# Patient Record
Sex: MALE | Race: WHITE | NOT HISPANIC OR LATINO | Employment: FULL TIME | URBAN - METROPOLITAN AREA
[De-identification: names, ages, dates, MRNs, and addresses within clinical notes are randomized per-mention and may not be internally consistent; named-entity substitution may affect disease eponyms.]

---

## 2020-09-15 ENCOUNTER — HOSPITAL ENCOUNTER (INPATIENT)
Facility: HOSPITAL | Age: 60
LOS: 1 days | Discharge: HOME | DRG: 177 | End: 2020-09-16
Attending: EMERGENCY MEDICINE | Admitting: INTERNAL MEDICINE
Payer: OTHER GOVERNMENT

## 2020-09-15 DIAGNOSIS — J18.9 PNEUMONIA OF BOTH LOWER LOBES DUE TO INFECTIOUS ORGANISM: ICD-10-CM

## 2020-09-15 DIAGNOSIS — U07.1 COVID-19 VIRUS INFECTION: Primary | ICD-10-CM

## 2020-09-15 DIAGNOSIS — Z20.822 SUSPECTED COVID-19 VIRUS INFECTION: ICD-10-CM

## 2020-09-15 DIAGNOSIS — R09.02 HYPOXEMIA: ICD-10-CM

## 2020-09-15 LAB
ALBUMIN SERPL BCP-MCNC: 3.6 G/DL (ref 3.5–5.2)
ALLENS TEST: ABNORMAL
ALP SERPL-CCNC: 66 U/L (ref 55–135)
ALT SERPL W/O P-5'-P-CCNC: 104 U/L (ref 10–44)
ANION GAP SERPL CALC-SCNC: 18 MMOL/L (ref 8–16)
AST SERPL-CCNC: 93 U/L (ref 10–40)
BASOPHILS # BLD AUTO: 0.02 K/UL (ref 0–0.2)
BASOPHILS NFR BLD: 0.5 % (ref 0–1.9)
BILIRUB SERPL-MCNC: 0.6 MG/DL (ref 0.1–1)
BUN SERPL-MCNC: 11 MG/DL (ref 6–20)
CALCIUM SERPL-MCNC: 9.3 MG/DL (ref 8.7–10.5)
CHLORIDE SERPL-SCNC: 92 MMOL/L (ref 95–110)
CK SERPL-CCNC: 343 U/L (ref 20–200)
CO2 SERPL-SCNC: 24 MMOL/L (ref 23–29)
CREAT SERPL-MCNC: 1.1 MG/DL (ref 0.5–1.4)
CRP SERPL-MCNC: 36.8 MG/L (ref 0–8.2)
DELSYS: ABNORMAL
DIFFERENTIAL METHOD: ABNORMAL
EOSINOPHIL # BLD AUTO: 0 K/UL (ref 0–0.5)
EOSINOPHIL NFR BLD: 0 % (ref 0–8)
ERYTHROCYTE [DISTWIDTH] IN BLOOD BY AUTOMATED COUNT: 12.4 % (ref 11.5–14.5)
EST. GFR  (AFRICAN AMERICAN): >60 ML/MIN/1.73 M^2
EST. GFR  (NON AFRICAN AMERICAN): >60 ML/MIN/1.73 M^2
FIO2: 28
FLOW: 2
GLUCOSE SERPL-MCNC: 333 MG/DL (ref 70–110)
HCO3 UR-SCNC: 26.4 MMOL/L (ref 24–28)
HCT VFR BLD AUTO: 52.2 % (ref 40–54)
HCV AB SERPL QL IA: NEGATIVE
HGB BLD-MCNC: 17.2 G/DL (ref 14–18)
HIV 1+2 AB+HIV1 P24 AG SERPL QL IA: NEGATIVE
IMM GRANULOCYTES # BLD AUTO: 0.03 K/UL (ref 0–0.04)
IMM GRANULOCYTES NFR BLD AUTO: 0.7 % (ref 0–0.5)
LACTATE SERPL-SCNC: 1.7 MMOL/L (ref 0.5–2.2)
LDH SERPL L TO P-CCNC: 411 U/L (ref 110–260)
LYMPHOCYTES # BLD AUTO: 0.9 K/UL (ref 1–4.8)
LYMPHOCYTES NFR BLD: 20.4 % (ref 18–48)
MCH RBC QN AUTO: 29.4 PG (ref 27–31)
MCHC RBC AUTO-ENTMCNC: 33 G/DL (ref 32–36)
MCV RBC AUTO: 89 FL (ref 82–98)
MODE: ABNORMAL
MONOCYTES # BLD AUTO: 0.6 K/UL (ref 0.3–1)
MONOCYTES NFR BLD: 13.5 % (ref 4–15)
NEUTROPHILS # BLD AUTO: 2.7 K/UL (ref 1.8–7.7)
NEUTROPHILS NFR BLD: 64.9 % (ref 38–73)
NRBC BLD-RTO: 0 /100 WBC
PCO2 BLDA: 36.1 MMHG (ref 35–45)
PH SMN: 7.47 [PH] (ref 7.35–7.45)
PLATELET # BLD AUTO: 187 K/UL (ref 150–350)
PMV BLD AUTO: 10.8 FL (ref 9.2–12.9)
PO2 BLDA: 51 MMHG (ref 80–100)
POC BE: 3 MMOL/L
POC SATURATED O2: 88 % (ref 95–100)
POTASSIUM SERPL-SCNC: 4.2 MMOL/L (ref 3.5–5.1)
PROT SERPL-MCNC: 7.9 G/DL (ref 6–8.4)
RBC # BLD AUTO: 5.86 M/UL (ref 4.6–6.2)
SAMPLE: ABNORMAL
SARS-COV-2 RDRP RESP QL NAA+PROBE: POSITIVE
SITE: ABNORMAL
SODIUM SERPL-SCNC: 134 MMOL/L (ref 136–145)
TROPONIN I SERPL DL<=0.01 NG/ML-MCNC: 0.02 NG/ML (ref 0–0.03)
WBC # BLD AUTO: 4.22 K/UL (ref 3.9–12.7)

## 2020-09-15 PROCEDURE — 25000003 PHARM REV CODE 250: Performed by: EMERGENCY MEDICINE

## 2020-09-15 PROCEDURE — 83605 ASSAY OF LACTIC ACID: CPT

## 2020-09-15 PROCEDURE — 83615 LACTATE (LD) (LDH) ENZYME: CPT

## 2020-09-15 PROCEDURE — 99291 CRITICAL CARE FIRST HOUR: CPT | Mod: 25

## 2020-09-15 PROCEDURE — 93010 ELECTROCARDIOGRAM REPORT: CPT | Mod: ,,, | Performed by: INTERNAL MEDICINE

## 2020-09-15 PROCEDURE — 84484 ASSAY OF TROPONIN QUANT: CPT

## 2020-09-15 PROCEDURE — 86803 HEPATITIS C AB TEST: CPT

## 2020-09-15 PROCEDURE — 99900035 HC TECH TIME PER 15 MIN (STAT)

## 2020-09-15 PROCEDURE — 82728 ASSAY OF FERRITIN: CPT

## 2020-09-15 PROCEDURE — 82550 ASSAY OF CK (CPK): CPT

## 2020-09-15 PROCEDURE — 80053 COMPREHEN METABOLIC PANEL: CPT

## 2020-09-15 PROCEDURE — 96372 THER/PROPH/DIAG INJ SC/IM: CPT

## 2020-09-15 PROCEDURE — U0002 COVID-19 LAB TEST NON-CDC: HCPCS

## 2020-09-15 PROCEDURE — 99285 EMERGENCY DEPT VISIT HI MDM: CPT | Mod: 25

## 2020-09-15 PROCEDURE — 87040 BLOOD CULTURE FOR BACTERIA: CPT | Mod: 59

## 2020-09-15 PROCEDURE — 93005 ELECTROCARDIOGRAM TRACING: CPT

## 2020-09-15 PROCEDURE — 63600175 PHARM REV CODE 636 W HCPCS: Performed by: EMERGENCY MEDICINE

## 2020-09-15 PROCEDURE — 94640 AIRWAY INHALATION TREATMENT: CPT

## 2020-09-15 PROCEDURE — 93010 EKG 12-LEAD: ICD-10-PCS | Mod: ,,, | Performed by: INTERNAL MEDICINE

## 2020-09-15 PROCEDURE — 86140 C-REACTIVE PROTEIN: CPT

## 2020-09-15 PROCEDURE — 36600 WITHDRAWAL OF ARTERIAL BLOOD: CPT

## 2020-09-15 PROCEDURE — 25000242 PHARM REV CODE 250 ALT 637 W/ HCPCS: Performed by: EMERGENCY MEDICINE

## 2020-09-15 PROCEDURE — 86703 HIV-1/HIV-2 1 RESULT ANTBDY: CPT

## 2020-09-15 PROCEDURE — 85025 COMPLETE CBC W/AUTO DIFF WBC: CPT

## 2020-09-15 PROCEDURE — 82803 BLOOD GASES ANY COMBINATION: CPT

## 2020-09-15 RX ORDER — DEXAMETHASONE SODIUM PHOSPHATE 4 MG/ML
6 INJECTION, SOLUTION INTRA-ARTICULAR; INTRALESIONAL; INTRAMUSCULAR; INTRAVENOUS; SOFT TISSUE
Status: COMPLETED | OUTPATIENT
Start: 2020-09-15 | End: 2020-09-15

## 2020-09-15 RX ORDER — IPRATROPIUM BROMIDE AND ALBUTEROL SULFATE 2.5; .5 MG/3ML; MG/3ML
3 SOLUTION RESPIRATORY (INHALATION)
Status: COMPLETED | OUTPATIENT
Start: 2020-09-15 | End: 2020-09-15

## 2020-09-15 RX ADMIN — IPRATROPIUM BROMIDE AND ALBUTEROL SULFATE 3 ML: .5; 3 SOLUTION RESPIRATORY (INHALATION) at 09:09

## 2020-09-15 RX ADMIN — SODIUM CHLORIDE 500 ML: 0.9 INJECTION, SOLUTION INTRAVENOUS at 10:09

## 2020-09-15 RX ADMIN — DEXAMETHASONE SODIUM PHOSPHATE 6 MG: 4 INJECTION, SOLUTION INTRA-ARTICULAR; INTRALESIONAL; INTRAMUSCULAR; INTRAVENOUS; SOFT TISSUE at 09:09

## 2020-09-16 VITALS
HEIGHT: 69 IN | TEMPERATURE: 97 F | RESPIRATION RATE: 18 BRPM | HEART RATE: 79 BPM | DIASTOLIC BLOOD PRESSURE: 79 MMHG | OXYGEN SATURATION: 97 % | SYSTOLIC BLOOD PRESSURE: 147 MMHG | BODY MASS INDEX: 44.73 KG/M2 | WEIGHT: 302 LBS

## 2020-09-16 PROBLEM — U07.1 COVID-19 VIRUS INFECTION: Status: ACTIVE | Noted: 2020-09-16

## 2020-09-16 PROBLEM — R73.9 HYPERGLYCEMIA: Status: ACTIVE | Noted: 2020-09-16

## 2020-09-16 PROBLEM — R74.8 ABNORMAL LIVER ENZYMES: Status: ACTIVE | Noted: 2020-09-16

## 2020-09-16 PROBLEM — R09.02 HYPOXIA: Status: ACTIVE | Noted: 2020-09-16

## 2020-09-16 PROBLEM — J18.9 PNEUMONIA OF BOTH LOWER LOBES DUE TO INFECTIOUS ORGANISM: Status: ACTIVE | Noted: 2020-09-16

## 2020-09-16 PROBLEM — E66.01 CLASS 3 SEVERE OBESITY WITHOUT SERIOUS COMORBIDITY WITH BODY MASS INDEX (BMI) OF 40.0 TO 44.9 IN ADULT: Status: ACTIVE | Noted: 2020-09-16

## 2020-09-16 LAB
25(OH)D3+25(OH)D2 SERPL-MCNC: 21 NG/ML (ref 30–96)
ALBUMIN SERPL BCP-MCNC: 3.4 G/DL (ref 3.5–5.2)
ALP SERPL-CCNC: 66 U/L (ref 55–135)
ALT SERPL W/O P-5'-P-CCNC: 112 U/L (ref 10–44)
ANION GAP SERPL CALC-SCNC: 16 MMOL/L (ref 8–16)
AST SERPL-CCNC: 83 U/L (ref 10–40)
BACTERIA #/AREA URNS HPF: ABNORMAL /HPF
BASOPHILS # BLD AUTO: 0 K/UL (ref 0–0.2)
BASOPHILS NFR BLD: 0 % (ref 0–1.9)
BILIRUB SERPL-MCNC: 0.4 MG/DL (ref 0.1–1)
BILIRUB UR QL STRIP: NEGATIVE
BNP SERPL-MCNC: 17 PG/ML (ref 0–99)
BUN SERPL-MCNC: 11 MG/DL (ref 6–20)
CALCIUM SERPL-MCNC: 8.7 MG/DL (ref 8.7–10.5)
CHLORIDE SERPL-SCNC: 98 MMOL/L (ref 95–110)
CHOLEST SERPL-MCNC: 163 MG/DL (ref 120–199)
CHOLEST/HDLC SERPL: 5.3 {RATIO} (ref 2–5)
CLARITY UR: CLEAR
CO2 SERPL-SCNC: 21 MMOL/L (ref 23–29)
COLOR UR: YELLOW
CREAT SERPL-MCNC: 1.1 MG/DL (ref 0.5–1.4)
D DIMER PPP IA.FEU-MCNC: 0.53 MG/L FEU
DIFFERENTIAL METHOD: ABNORMAL
EOSINOPHIL # BLD AUTO: 0 K/UL (ref 0–0.5)
EOSINOPHIL NFR BLD: 0 % (ref 0–8)
ERYTHROCYTE [DISTWIDTH] IN BLOOD BY AUTOMATED COUNT: 12.3 % (ref 11.5–14.5)
ERYTHROCYTE [SEDIMENTATION RATE] IN BLOOD BY WESTERGREN METHOD: 17 MM/HR (ref 0–10)
EST. GFR  (AFRICAN AMERICAN): >60 ML/MIN/1.73 M^2
EST. GFR  (NON AFRICAN AMERICAN): >60 ML/MIN/1.73 M^2
ESTIMATED AVG GLUCOSE: 286 MG/DL (ref 68–131)
FERRITIN SERPL-MCNC: 1271 NG/ML (ref 20–300)
GLUCOSE SERPL-MCNC: 353 MG/DL (ref 70–110)
GLUCOSE UR QL STRIP: ABNORMAL
HBA1C MFR BLD HPLC: 11.6 % (ref 4–5.6)
HCT VFR BLD AUTO: 50.6 % (ref 40–54)
HDLC SERPL-MCNC: 31 MG/DL (ref 40–75)
HDLC SERPL: 19 % (ref 20–50)
HGB BLD-MCNC: 16.6 G/DL (ref 14–18)
HGB UR QL STRIP: ABNORMAL
IMM GRANULOCYTES # BLD AUTO: 0.03 K/UL (ref 0–0.04)
IMM GRANULOCYTES NFR BLD AUTO: 1 % (ref 0–0.5)
KETONES UR QL STRIP: ABNORMAL
LDLC SERPL CALC-MCNC: 105 MG/DL (ref 63–159)
LEUKOCYTE ESTERASE UR QL STRIP: NEGATIVE
LYMPHOCYTES # BLD AUTO: 0.6 K/UL (ref 1–4.8)
LYMPHOCYTES NFR BLD: 19.6 % (ref 18–48)
MAGNESIUM SERPL-MCNC: 1.9 MG/DL (ref 1.6–2.6)
MCH RBC QN AUTO: 29.6 PG (ref 27–31)
MCHC RBC AUTO-ENTMCNC: 32.8 G/DL (ref 32–36)
MCV RBC AUTO: 90 FL (ref 82–98)
MICROSCOPIC COMMENT: ABNORMAL
MONOCYTES # BLD AUTO: 0.3 K/UL (ref 0.3–1)
MONOCYTES NFR BLD: 9.6 % (ref 4–15)
NEUTROPHILS # BLD AUTO: 2.1 K/UL (ref 1.8–7.7)
NEUTROPHILS NFR BLD: 69.8 % (ref 38–73)
NITRITE UR QL STRIP: NEGATIVE
NONHDLC SERPL-MCNC: 132 MG/DL
NRBC BLD-RTO: 0 /100 WBC
PH UR STRIP: 6 [PH] (ref 5–8)
PHOSPHATE SERPL-MCNC: 4.2 MG/DL (ref 2.7–4.5)
PLATELET # BLD AUTO: 203 K/UL (ref 150–350)
PMV BLD AUTO: 10.2 FL (ref 9.2–12.9)
POCT GLUCOSE: 305 MG/DL (ref 70–110)
POCT GLUCOSE: 326 MG/DL (ref 70–110)
POTASSIUM SERPL-SCNC: 4.3 MMOL/L (ref 3.5–5.1)
PROT SERPL-MCNC: 7.2 G/DL (ref 6–8.4)
PROT UR QL STRIP: ABNORMAL
RBC # BLD AUTO: 5.61 M/UL (ref 4.6–6.2)
RBC #/AREA URNS HPF: 5 /HPF (ref 0–4)
SODIUM SERPL-SCNC: 135 MMOL/L (ref 136–145)
SP GR UR STRIP: 1.02 (ref 1–1.03)
TRIGL SERPL-MCNC: 135 MG/DL (ref 30–150)
TSH SERPL DL<=0.005 MIU/L-ACNC: 0.45 UIU/ML (ref 0.4–4)
URN SPEC COLLECT METH UR: ABNORMAL
UROBILINOGEN UR STRIP-ACNC: NEGATIVE EU/DL
WBC # BLD AUTO: 3.01 K/UL (ref 3.9–12.7)
YEAST URNS QL MICRO: ABNORMAL

## 2020-09-16 PROCEDURE — 85651 RBC SED RATE NONAUTOMATED: CPT

## 2020-09-16 PROCEDURE — 27000221 HC OXYGEN, UP TO 24 HOURS

## 2020-09-16 PROCEDURE — 82306 VITAMIN D 25 HYDROXY: CPT

## 2020-09-16 PROCEDURE — 21400001 HC TELEMETRY ROOM

## 2020-09-16 PROCEDURE — 83036 HEMOGLOBIN GLYCOSYLATED A1C: CPT

## 2020-09-16 PROCEDURE — 25000003 PHARM REV CODE 250: Performed by: EMERGENCY MEDICINE

## 2020-09-16 PROCEDURE — 85379 FIBRIN DEGRADATION QUANT: CPT

## 2020-09-16 PROCEDURE — C9399 UNCLASSIFIED DRUGS OR BIOLOG: HCPCS | Performed by: EMERGENCY MEDICINE

## 2020-09-16 PROCEDURE — 83880 ASSAY OF NATRIURETIC PEPTIDE: CPT

## 2020-09-16 PROCEDURE — 80061 LIPID PANEL: CPT

## 2020-09-16 PROCEDURE — 94761 N-INVAS EAR/PLS OXIMETRY MLT: CPT

## 2020-09-16 PROCEDURE — 84443 ASSAY THYROID STIM HORMONE: CPT

## 2020-09-16 PROCEDURE — 25000003 PHARM REV CODE 250: Performed by: INTERNAL MEDICINE

## 2020-09-16 PROCEDURE — 81000 URINALYSIS NONAUTO W/SCOPE: CPT

## 2020-09-16 PROCEDURE — 99900035 HC TECH TIME PER 15 MIN (STAT)

## 2020-09-16 PROCEDURE — 63600175 PHARM REV CODE 636 W HCPCS: Performed by: INTERNAL MEDICINE

## 2020-09-16 PROCEDURE — 80053 COMPREHEN METABOLIC PANEL: CPT

## 2020-09-16 PROCEDURE — 83735 ASSAY OF MAGNESIUM: CPT

## 2020-09-16 PROCEDURE — 84100 ASSAY OF PHOSPHORUS: CPT

## 2020-09-16 PROCEDURE — 80074 ACUTE HEPATITIS PANEL: CPT

## 2020-09-16 PROCEDURE — 85025 COMPLETE CBC W/AUTO DIFF WBC: CPT

## 2020-09-16 PROCEDURE — 36415 COLL VENOUS BLD VENIPUNCTURE: CPT

## 2020-09-16 RX ORDER — INSULIN ASPART 100 [IU]/ML
1-10 INJECTION, SOLUTION INTRAVENOUS; SUBCUTANEOUS
Status: DISCONTINUED | OUTPATIENT
Start: 2020-09-16 | End: 2020-09-16 | Stop reason: HOSPADM

## 2020-09-16 RX ORDER — IBUPROFEN 200 MG
24 TABLET ORAL
Status: DISCONTINUED | OUTPATIENT
Start: 2020-09-16 | End: 2020-09-16 | Stop reason: HOSPADM

## 2020-09-16 RX ORDER — ASPIRIN 325 MG
325 TABLET ORAL DAILY
Qty: 14 TABLET | Refills: 0
Start: 2020-09-16 | End: 2020-09-30

## 2020-09-16 RX ORDER — ENOXAPARIN SODIUM 100 MG/ML
40 INJECTION SUBCUTANEOUS EVERY 24 HOURS
Status: DISCONTINUED | OUTPATIENT
Start: 2020-09-16 | End: 2020-09-16 | Stop reason: HOSPADM

## 2020-09-16 RX ORDER — ACETAMINOPHEN 325 MG/1
650 TABLET ORAL EVERY 8 HOURS PRN
Refills: 0
Start: 2020-09-16 | End: 2020-09-30

## 2020-09-16 RX ORDER — ASPIRIN 325 MG
325 TABLET ORAL DAILY
Status: DISCONTINUED | OUTPATIENT
Start: 2020-09-16 | End: 2020-09-16 | Stop reason: HOSPADM

## 2020-09-16 RX ORDER — GUAIFENESIN/DEXTROMETHORPHAN 100-10MG/5
10 SYRUP ORAL EVERY 4 HOURS PRN
Status: DISCONTINUED | OUTPATIENT
Start: 2020-09-16 | End: 2020-09-16 | Stop reason: HOSPADM

## 2020-09-16 RX ORDER — ONDANSETRON 2 MG/ML
4 INJECTION INTRAMUSCULAR; INTRAVENOUS EVERY 8 HOURS PRN
Status: DISCONTINUED | OUTPATIENT
Start: 2020-09-16 | End: 2020-09-16 | Stop reason: HOSPADM

## 2020-09-16 RX ORDER — IBUPROFEN 200 MG
16 TABLET ORAL
Status: DISCONTINUED | OUTPATIENT
Start: 2020-09-16 | End: 2020-09-16 | Stop reason: HOSPADM

## 2020-09-16 RX ORDER — DEXAMETHASONE 6 MG/1
6 TABLET ORAL DAILY
Qty: 10 TABLET | Refills: 0 | Status: SHIPPED | OUTPATIENT
Start: 2020-09-17 | End: 2020-09-27

## 2020-09-16 RX ORDER — CHOLECALCIFEROL (VITAMIN D3) 25 MCG
1000 TABLET ORAL DAILY
Status: DISCONTINUED | OUTPATIENT
Start: 2020-09-16 | End: 2020-09-16 | Stop reason: HOSPADM

## 2020-09-16 RX ORDER — SODIUM CHLORIDE 0.9 % (FLUSH) 0.9 %
10 SYRINGE (ML) INJECTION
Status: DISCONTINUED | OUTPATIENT
Start: 2020-09-16 | End: 2020-09-16 | Stop reason: HOSPADM

## 2020-09-16 RX ORDER — ASCORBIC ACID 500 MG
500 TABLET ORAL 2 TIMES DAILY
COMMUNITY
Start: 2020-09-16 | End: 2020-09-30

## 2020-09-16 RX ORDER — SODIUM CHLORIDE 9 MG/ML
INJECTION, SOLUTION INTRAVENOUS CONTINUOUS
Status: ACTIVE | OUTPATIENT
Start: 2020-09-16 | End: 2020-09-16

## 2020-09-16 RX ORDER — ACETAMINOPHEN 325 MG/1
650 TABLET ORAL EVERY 8 HOURS PRN
Status: DISCONTINUED | OUTPATIENT
Start: 2020-09-16 | End: 2020-09-16 | Stop reason: HOSPADM

## 2020-09-16 RX ORDER — GLUCAGON 1 MG
1 KIT INJECTION
Status: DISCONTINUED | OUTPATIENT
Start: 2020-09-16 | End: 2020-09-16 | Stop reason: HOSPADM

## 2020-09-16 RX ORDER — ASCORBIC ACID 500 MG
500 TABLET ORAL 2 TIMES DAILY
Status: DISCONTINUED | OUTPATIENT
Start: 2020-09-16 | End: 2020-09-16 | Stop reason: HOSPADM

## 2020-09-16 RX ORDER — CHOLECALCIFEROL (VITAMIN D3) 25 MCG
1000 TABLET ORAL DAILY
Qty: 30 TABLET | Refills: 0
Start: 2020-09-17 | End: 2020-10-17

## 2020-09-16 RX ORDER — GUAIFENESIN/DEXTROMETHORPHAN 100-10MG/5
10 SYRUP ORAL EVERY 4 HOURS PRN
Refills: 0 | COMMUNITY
Start: 2020-09-16 | End: 2020-09-26

## 2020-09-16 RX ADMIN — INSULIN ASPART 8 UNITS: 100 INJECTION, SOLUTION INTRAVENOUS; SUBCUTANEOUS at 11:09

## 2020-09-16 RX ADMIN — THERA TABS 1 TABLET: TAB at 09:09

## 2020-09-16 RX ADMIN — INSULIN DETEMIR 15 UNITS: 100 INJECTION, SOLUTION SUBCUTANEOUS at 08:09

## 2020-09-16 RX ADMIN — Medication 1000 UNITS: at 09:09

## 2020-09-16 RX ADMIN — DEXAMETHASONE 6 MG: 4 TABLET ORAL at 09:09

## 2020-09-16 RX ADMIN — SODIUM CHLORIDE: 0.9 INJECTION, SOLUTION INTRAVENOUS at 01:09

## 2020-09-16 RX ADMIN — OXYCODONE HYDROCHLORIDE AND ACETAMINOPHEN 500 MG: 500 TABLET ORAL at 09:09

## 2020-09-16 NOTE — H&P
[facility] Hospital Medicine  History & Physical    Patient Name: Simon Holcomb  MRN: [id number]  Admission Date: 9/15/2020  Attending Physician: No att. providers found   Primary Care Provider: Primary Doctor No         Patient information was obtained from patient and ER records.     Subjective:     Principal Problem:COVID-19 virus infection    Chief Complaint:   Chief Complaint   Patient presents with    Shortness of Breath     tested + COVID at [facility], sent over for low O2 sat.         HPI:   61 y/o wm with no significant PMHx presented to the ER with a c/o sob for the last couple of days which has gotten worse today . He is complaining of sob associated with a dry cough  And low grade fever . He denies any diarrhea , nausea , vomit , lost of smell and taste .  He was  Diagnose  2 week ago with a bronchitis .   ER Course : Po2 51 , o2sat 88% , CRP 36.8 ,  ,   . CXR show mild B/L infiltrate , The CBG at the ER was 333.   ER VS   Initial Vitals [09/15/20 2005]   BP Pulse Resp Temp SpO2   (!) 163/87 98 (!) 24 99.3 °F (37.4 °C) (!) 87      Pt will be admitted to Inpt with a dx of  Hypoxic covid -19 pneumonitis     PMHx : None  Past surgical H/O: reviewed an no significant   Past family H/o reviewed an no significant     No past medical history on file.    No past surgical history on file.    Review of patient's allergies indicates:  No Known Allergies    No current facility-administered medications on file prior to encounter.      No current outpatient medications on file prior to encounter.     Family History     None        Tobacco Use    Smoking status: Not on file   Substance and Sexual Activity    Alcohol use: Not on file    Drug use: Not on file    Sexual activity: Not on file     Review of Systems   Constitutional: Positive for fatigue and fever.   HENT: Negative.    Eyes: Negative.    Respiratory: Positive for cough, shortness of breath and wheezing.    Gastrointestinal:  Negative.    Endocrine: Negative.    Genitourinary: Negative.    Musculoskeletal: Negative.    Allergic/Immunologic: Negative.    Neurological: Negative.    Hematological: Negative.    Psychiatric/Behavioral: Negative.      Objective:     Vital Signs (Most Recent):  Temp: 98.3 °F (36.8 °C) (09/16/20 0000)  Pulse: 104 (09/16/20 0000)  Resp: 18 (09/16/20 0000)  BP: (!) 167/84 (09/16/20 0000)  SpO2: 96 % (09/16/20 0000) Vital Signs (24h Range):  Temp:  [98.3 °F (36.8 °C)-99.3 °F (37.4 °C)] 98.3 °F (36.8 °C)  Pulse:  [] 104  Resp:  [18-24] 18  SpO2:  [87 %-96 %] 96 %  BP: (163-167)/(84-87) 167/84     Weight: 131.8 kg (290 lb 7.3 oz)  Body mass index is 42.89 kg/m².    Physical Exam  Vitals signs and nursing note reviewed.   Constitutional:       General: He is not in acute distress.     Appearance: Normal appearance. He is not ill-appearing.   HENT:      Head: Normocephalic and atraumatic.      Nose: Nose normal. No congestion or rhinorrhea.      Mouth/Throat:      Mouth: Mucous membranes are moist.   Eyes:      Conjunctiva/sclera: Conjunctivae normal.      Pupils: Pupils are equal, round, and reactive to light.   Neck:      Musculoskeletal: Normal range of motion and neck supple. No neck rigidity or muscular tenderness.      Vascular: No carotid bruit.   Cardiovascular:      Rate and Rhythm: Normal rate and regular rhythm.      Pulses: Normal pulses.      Heart sounds: Normal heart sounds. No murmur. No friction rub.   Pulmonary:      Effort: Pulmonary effort is normal. No respiratory distress.      Breath sounds: Normal breath sounds. No stridor. No wheezing, rhonchi or rales.   Abdominal:      General: Abdomen is flat. Bowel sounds are normal. There is no distension.      Palpations: Abdomen is soft. There is no mass.      Tenderness: There is no abdominal tenderness. There is no guarding or rebound.      Hernia: No hernia is present.   Musculoskeletal: Normal range of motion.         General: No swelling,  tenderness, deformity or signs of injury.   Lymphadenopathy:      Cervical: No cervical adenopathy.   Skin:     General: Skin is warm.      Coloration: Skin is not jaundiced or pale.      Findings: No bruising or erythema.   Neurological:      General: No focal deficit present.      Mental Status: He is alert and oriented to person, place, and time. Mental status is at baseline.      Cranial Nerves: No cranial nerve deficit.      Sensory: No sensory deficit.           CRANIAL NERVES     CN III, IV, VI   Pupils are equal, round, and reactive to light.       Significant Labs: All pertinent labs within the past 24 hours have been reviewed.    Significant Imaging: I have reviewed all pertinent imaging results/findings within the past 24 hours.    Assessment/Plan:     * COVID-19 virus infection  Start prednisone 6 mg po qd x 10 days  Start vitamins and minerals   Keep o2 sat > 92 %  F/u daily labs         Abnormal liver enzymes  Transaminitis  Most likely duet  to [facility]    will screen for acute hepatitis     Hypoxia  2/2 to covid 19 infection   tx as above       Class 3 severe obesity without serious comorbidity with body mass index (BMI) of 40.0 to 44.9 in adult  Will  Screen for DM , HLD and thyroid  Counseled       Hyperglycemia  No h/o of Diabetes  Will check Hba1c  Start ISS         Pneumonia of both lower lobes due to infectious organism  2/2 to covid -19 infection         VTE Risk Mitigation (From admission, onward)         Ordered     enoxaparin injection 40 mg  Every 24 hours      09/16/20 0108     IP VTE HIGH RISK PATIENT  Once      09/16/20 0108     Place sequential compression device  Until discontinued      09/16/20 0108                   Ajay Mccormack MD  Department of Hospital Medicine   [facility]

## 2020-09-16 NOTE — HPI
61 y/o wm with no significant PMHx presented to the ER with a c/o sob for the last couple of days which has gotten worse today . He is complaining of sob associated with a dry cough  And low grade fever . He denies any diarrhea , nausea , vomit , lost of smell and taste .  He was  Diagnose  2 week ago with a bronchitis .   ER Course : Po2 51 , o2sat 88% , CRP 36.8 ,  ,   . CXR show mild B/L infiltrate , The CBG at the ER was 333.   ER VS   Initial Vitals [09/15/20 2005]   BP Pulse Resp Temp SpO2   (!) 163/87 98 (!) 24 99.3 °F (37.4 °C) (!) 87      Pt will be admitted to Inpt with a dx of  Hypoxic covid -19 pneumonitis     PMHx : None  Past surgical H/O: reviewed an no significant   Past family H/o reviewed an no significant

## 2020-09-16 NOTE — DISCHARGE SUMMARY
[facility] Hospital Medicine  Discharge Summary      Patient Name: Simon Holcomb  MRN: [id number]  Admission Date: 9/15/2020  Hospital Length of Stay: 1 days  Discharge Date and Time: 9/16/2020  8:16 PM  Attending Physician: Dr. Belinda Vargas   Discharging Provider: Kellen Contreras NP  Primary Care Provider: Primary Doctor No      HPI:   61 y/o wm with no significant PMHx presented to the ER with a c/o sob for the last couple of days which has gotten worse today . He is complaining of sob associated with a dry cough  And low grade fever . He denies any diarrhea , nausea , vomit , lost of smell and taste .  He was diagnosed  2 week ago with a bronchitis . ER Course : Po2 51 , o2sat 88% , CRP 36.8 ,  ,  . CXR show mild B/L infiltrate , The CBG at the ER was 333.  Hospital Medicine contacted for admission.    PMHx : None  Past surgical H/O: reviewed an no significant   Past family H/o reviewed an no significant     * No surgery found *      Hospital Course:   Pt admitted for Hypoxia and Pneumonia in the setting of COVID 19 virus.  Imaging supports scattered pulmonary infiltrates which can be seen with viral pneumonitis/COVID-19. Pt treated with supplemental oxygen, antibiotics, inhaler treatments, steroids, and vitamin supplements.  Elevated LFTs, Ferritin, CPK, LD, ESR, and D-dimer noted.  Pt verbalized symptom improvement on prescribed regime.  Home oxygen evaluation performed and pt does not require oxygen.  Pt verbalized symptom improvement and states he is very eager for discharge.  No signs of acute distress noted.  Pt examined and deemed stable for discharge to home.  Pt instructed to maintain compliance with prescribed medications, dietary restrictions, and follow up appointments with understanding verbalized.  Current medications resumed with Tylenol, Vitamin C, ASA, Decadron, Robitussin, MVI, and Vitamin D prescribed.  Referral to COVID Surveillance Program placed and pulse oximeter  ordered.  COVID 19 discharge instructions reviewed with patient prior to discharge with understanding verbalized.  Pt instructed to follow up with [facility] upon discharge for further evaluation.       Consults:     Final Active Diagnoses:    Diagnosis Date Noted POA    PRINCIPAL PROBLEM:  COVID-19 virus infection [U07.1] 09/16/2020 Yes    Pneumonia of both lower lobes due to infectious organism [J18.1] 09/16/2020 Yes    Hyperglycemia [R73.9] 09/16/2020 Yes    Class 3 severe obesity without serious comorbidity with body mass index (BMI) of 40.0 to 44.9 in adult [E66.01, Z68.41] 09/16/2020 Not Applicable    Hypoxia [R09.02] 09/16/2020 Yes    Abnormal liver enzymes [R74.8] 09/16/2020 Yes      Problems Resolved During this Admission:       Discharged Condition: stable    Disposition: Home or Self Care    Follow Up:  Follow-up Information     [facility] In 3 days.    Why: -hospital follow up for COVID 19   Contact information:      [address] [place] [address], [place] [address] [phone]               Patient Instructions:      Diet diabetic     Diet Cardiac     COVID-19 Surveillance Program     Order Specific Question Answer Comments   Does patient have a smartphone? Yes    Does patient have the [facility] on their smartphone? No    While in surveillance program, will patient be using home oxygen? Yes      Notify your health care provider if you experience any of the following:  temperature >100.4     Notify your health care provider if you experience any of the following:  persistent nausea and vomiting or diarrhea     Notify your health care provider if you experience any of the following:  increased confusion or weakness     Notify your health care provider if you experience any of the following:  persistent dizziness, light-headedness, or visual disturbances     Notify your health care provider if you experience any of the following:  severe persistent headache     Notify your  health care provider if you experience any of the following:  difficulty breathing or increased cough     Notify your health care provider if you experience any of the following:  severe uncontrolled pain     Activity as tolerated       Significant Diagnostic Studies: Labs: All labs within the past 24 hours have been reviewed    Pending Diagnostic Studies:     None         Medications:  Reconciled Home Medications:      Medication List      START taking these medications    acetaminophen 325 MG tablet  Commonly known as: TYLENOL  Take 2 tablets (650 mg total) by mouth every 8 (eight) hours as needed for Pain or Temperature greater than (or equal to 101 degree F).     ascorbic acid (vitamin C) 500 MG tablet  Commonly known as: VITAMIN C  Take 1 tablet (500 mg total) by mouth 2 (two) times daily. for 14 days     aspirin 325 MG tablet  Take 1 tablet (325 mg total) by mouth once daily. for 14 days     dexAMETHasone 6 MG tablet  Commonly known as: DECADRON  Take 1 tablet (6 mg total) by mouth once daily. for 10 days     dextromethorphan-guaifenesin  mg/5 ml  mg/5 mL liquid  Commonly known as: ROBITUSSIN-DM  Take 10 mLs by mouth every 4 (four) hours as needed.     multivitamin Tab  Take 1 tablet by mouth once daily. for 10 days     pulse oximeter device  Commonly known as: pulse oximeter  by Apply Externally route 2 (two) times a day. Use twice daily at 8 AM and 3 PM and [facility].     vitamin D 1000 units Tab  Commonly known as: VITAMIN D3  Take 1 tablet (1,000 Units total) by mouth once daily.            Indwelling Lines/Drains at time of discharge:   Lines/Drains/Airways     None                 Time spent on the discharge of patient: > 36 minutes  Patient was seen and examined on the date of discharge and determined to be suitable for discharge.         Kellen oCntreras NP  Department of Hospital Medicine  [facility]

## 2020-09-16 NOTE — PROGRESS NOTES
Home Oxygen Evaluation    Date Performed: 2020    1) Patient's Home O2 Sat on room air, while at rest: 93%      If O2 sats on room air at rest are 88% or below, patient qualifies. No additional testing needed. Document N/A in steps 2 and 3. If 89% or above, complete steps 2.      2) Patient's O2 Sat on room air while exercisin%        If O2 sats on room air while exercising remain 89% or above patient does not qualify, no further testing needed Document N/A in step 3. If O2 sats on room air while exercising are 88% or below, continue to step 3.      3) Patient's O2 Sat while exercising on O2: NA  at NA LPM         (Must show improvement from #2 for patients to qualify)    If O2 sats improve on oxygen, patient qualifies for portable oxygen. If not, the patient does not qualify.

## 2020-09-16 NOTE — PLAN OF CARE
POC reviwed,verbalized understanding.Pt remained free from falls. Fall precautions in place.VSS. PIV intact. Call bell and personal belongings within reach. Hourly rounding complete. No c/o at this time. Reminded to call for assistance. Will continue to monitor.

## 2020-09-16 NOTE — ED PROVIDER NOTES
SCRIBE #1 NOTE: I, Dianelys Crowe, am scribing for, and in the presence of, Abner Vickers Jr., MD. I have scribed the entire note.      History      Chief Complaint   Patient presents with    Shortness of Breath     tested + COVID at [facility], sent over for low O2 sat.        Review of patient's allergies indicates:  No Known Allergies     HPI   HPI    9/15/2020, 8:47 PM   History obtained from the patient      History of Present Illness: Simon Holcomb is a 60 y.o. male patient who presents to the Emergency Department for evaluation of shortness of breath which onset gradually over the past 4 days.  Patient had a positive COVID test earlier today at urgent care.  Patient was hypoxic at 82% as well.  This is refractory to home albuterol.  Patient has no chest pain or pedal edema.  He has had fever and headache and body aches.  Mild nasal congestion.    Arrival mode: Personal vehicle     PCP: Primary Doctor No     Past Medical History:  History reviewed. No pertinent medical history.    Past Surgical History:  History reviewed. No pertinent surgical history.    Family History:  History reviewed. No pertinent family history.    Social History:  Social History Main Topics    Smoking status: Unknown if ever smoked    Smokeless tobacco: Unknown if ever used    Alcohol Use: Unknown drinking history    Drug Use: Unknown if ever used    Sexual Activity: Unknown         ROS   Review of Systems   Constitutional: Positive for chills and fever.   HENT: Positive for congestion, rhinorrhea and sore throat.    Respiratory: Positive for cough, shortness of breath and wheezing.    Cardiovascular: Negative for chest pain.   Gastrointestinal: Negative for abdominal pain, nausea and vomiting.   Genitourinary: Negative for dysuria.   Musculoskeletal: Negative for back pain.   Skin: Negative for rash.   Neurological: Negative for weakness.   Hematological: Does not bruise/bleed easily.   All other systems reviewed and are  negative.    Physical Exam      Initial Vitals [09/15/20 2005]   BP Pulse Resp Temp SpO2   (!) 163/87 98 (!) 24 99.3 °F (37.4 °C) (!) 87 %      MAP       --          Physical Exam  Nursing Notes and Vital Signs Reviewed.  GEN:  Alert and oriented to person place and time.  No acute distress.  HEENT:  Normocephalic atraumatic. Extraocular muscles intact bilaterally. No evidence of entrapment.  No nasal deformity.  Nasal septum is midline.  There is no septal hematoma.  Tympanic membranes are normal. No hemotympanum.  Negative Ramirez sign. No CSF leak  CV:.  Regular rate and rhythm without gallops murmurs or rubs. 2+ pulses bilateral upper and lower extremities.  PULM:   Tachypneic with coarse sounds  Bilaterally.  Mild accessory muscle use  Gi:  Soft nontender nondistended with normoactive bowel sounds  :.  No CVA tenderness. No suprapubic tenderness.  MS:  There is no point C/T/L/S tenderness. Normal spinal curvature.  Pelvis is stable nontender.  There is no chest wall tenderness.  Clavicles are nontender. All bones been palpated and joints ranged fully without tenderness or deformity.  NEURO:  II-XII intact bilaterally. No focal lateralizing signs.  SKIN:  Intact. No rash or laceration.      ED Course    Critical Care    Date/Time: 9/16/2020 12:06 AM  Performed by: Abner Vickers Jr., MD  Authorized by: Abner Vickers Jr., MD   Direct patient critical care time: 20 minutes  Additional history critical care time: 5 minutes  Ordering / reviewing critical care time: 5 minutes  Documentation critical care time: 5 minutes  Consulting other physicians critical care time: 5 minutes  Total critical care time (exclusive of procedural time) : 40 minutes  Critical care time was exclusive of separately billable procedures and treating other patients and teaching time.  Critical care was necessary to treat or prevent imminent or life-threatening deterioration of the following conditions: Hypoxemia.  Critical care was  "time spent personally by me on the following activities: development of treatment plan with patient or surrogate, blood draw for specimens, discussions with consultants, interpretation of cardiac output measurements, evaluation of patient's response to treatment, examination of patient, obtaining history from patient or surrogate, ordering and performing treatments and interventions, ordering and review of laboratory studies, ordering and review of radiographic studies, pulse oximetry, re-evaluation of patient's condition and review of old charts.        ED Vital Signs:  Vitals:    09/15/20 2005 09/15/20 2127 09/15/20 2130 09/15/20 2135   BP: (!) 163/87      Pulse: 98 89 90 95   Resp: (!) 24 18 18 18   Temp: 99.3 °F (37.4 °C)      TempSrc: Oral      SpO2: (!) 87%      Weight: 131.8 kg (290 lb 7.3 oz)      Height: 5' 9" (1.753 m)       09/15/20 2137 09/16/20 0000   BP:  (!) 167/84   Pulse:  104   Resp: 18 18   Temp:  98.3 °F (36.8 °C)   TempSrc:  Axillary   SpO2:  96%   Weight:     Height:         Abnormal Lab Results:  Labs Reviewed   CBC W/ AUTO DIFFERENTIAL - Abnormal; Notable for the following components:       Result Value    Immature Granulocytes 0.7 (*)     Lymph # 0.9 (*)     All other components within normal limits   COMPREHENSIVE METABOLIC PANEL - Abnormal; Notable for the following components:    Sodium 134 (*)     Chloride 92 (*)     Glucose 333 (*)     AST 93 (*)      (*)     Anion Gap 18 (*)     All other components within normal limits   C-REACTIVE PROTEIN - Abnormal; Notable for the following components:    CRP 36.8 (*)     All other components within normal limits   LACTATE DEHYDROGENASE - Abnormal; Notable for the following components:     (*)     All other components within normal limits   CK - Abnormal; Notable for the following components:     (*)     All other components within normal limits   SARS-COV-2 RNA AMPLIFICATION, QUAL - Abnormal; Notable for the following " components:    SARS-CoV-2 RNA, Amplification, Qual Positive (*)     All other components within normal limits   ISTAT PROCEDURE - Abnormal; Notable for the following components:    POC PH 7.472 (*)     POC PO2 51 (*)     POC SATURATED O2 88 (*)     All other components within normal limits   CULTURE, BLOOD   CULTURE, BLOOD   HIV 1 / 2 ANTIBODY   HEPATITIS C ANTIBODY   LACTIC ACID, PLASMA   TROPONIN I   FERRITIN        All Lab Results:  Results for orders placed or performed during the hospital encounter of 09/15/20   HIV 1/2 Ag/Ab (4th Gen)   Result Value Ref Range    HIV 1/2 Ag/Ab Negative Negative   Hepatitis C antibody   Result Value Ref Range    Hepatitis C Ab Negative Negative   CBC auto differential   Result Value Ref Range    WBC 4.22 3.90 - 12.70 K/uL    RBC 5.86 4.60 - 6.20 M/uL    Hemoglobin 17.2 14.0 - 18.0 g/dL    Hematocrit 52.2 40.0 - 54.0 %    Mean Corpuscular Volume 89 82 - 98 fL    Mean Corpuscular Hemoglobin 29.4 27.0 - 31.0 pg    Mean Corpuscular Hemoglobin Conc 33.0 32.0 - 36.0 g/dL    RDW 12.4 11.5 - 14.5 %    Platelets 187 150 - 350 K/uL    MPV 10.8 9.2 - 12.9 fL    Immature Granulocytes 0.7 (H) 0.0 - 0.5 %    Gran # (ANC) 2.7 1.8 - 7.7 K/uL    Immature Grans (Abs) 0.03 0.00 - 0.04 K/uL    Lymph # 0.9 (L) 1.0 - 4.8 K/uL    Mono # 0.6 0.3 - 1.0 K/uL    Eos # 0.0 0.0 - 0.5 K/uL    Baso # 0.02 0.00 - 0.20 K/uL    nRBC 0 0 /100 WBC    Gran% 64.9 38.0 - 73.0 %    Lymph% 20.4 18.0 - 48.0 %    Mono% 13.5 4.0 - 15.0 %    Eosinophil% 0.0 0.0 - 8.0 %    Basophil% 0.5 0.0 - 1.9 %    Differential Method Automated    Comprehensive metabolic panel   Result Value Ref Range    Sodium 134 (L) 136 - 145 mmol/L    Potassium 4.2 3.5 - 5.1 mmol/L    Chloride 92 (L) 95 - 110 mmol/L    CO2 24 23 - 29 mmol/L    Glucose 333 (H) 70 - 110 mg/dL    BUN, Bld 11 6 - 20 mg/dL    Creatinine 1.1 0.5 - 1.4 mg/dL    Calcium 9.3 8.7 - 10.5 mg/dL    Total Protein 7.9 6.0 - 8.4 g/dL    Albumin 3.6 3.5 - 5.2 g/dL    Total Bilirubin  0.6 0.1 - 1.0 mg/dL    Alkaline Phosphatase 66 55 - 135 U/L    AST 93 (H) 10 - 40 U/L     (H) 10 - 44 U/L    Anion Gap 18 (H) 8 - 16 mmol/L    eGFR if African American >60 >60 mL/min/1.73 m^2    eGFR if non African American >60 >60 mL/min/1.73 m^2   C-Reactive Protein   Result Value Ref Range    CRP 36.8 (H) 0.0 - 8.2 mg/L   Lactate Dehydrogenase   Result Value Ref Range     (H) 110 - 260 U/L   CK   Result Value Ref Range     (H) 20 - 200 U/L   Lactic Acid, Plasma   Result Value Ref Range    Lactate (Lactic Acid) 1.7 0.5 - 2.2 mmol/L   Troponin I   Result Value Ref Range    Troponin I 0.020 0.000 - 0.026 ng/mL   COVID-19 Rapid Screening   Result Value Ref Range    SARS-CoV-2 RNA, Amplification, Qual Positive (A) Negative   ISTAT PROCEDURE   Result Value Ref Range    POC PH 7.472 (H) 7.35 - 7.45    POC PCO2 36.1 35 - 45 mmHg    POC PO2 51 (LL) 80 - 100 mmHg    POC HCO3 26.4 24 - 28 mmol/L    POC BE 3 -2 to 2 mmol/L    POC SATURATED O2 88 (L) 95 - 100 %    Sample ARTERIAL     Site RR     Allens Test Pass     DelSys Nasal Can     Mode SPONT     Flow 2     FiO2 28          Imaging Results:  Imaging Results          X-Ray Chest AP Portable (In process)                cxr (ed read):  Bilateral lower lobe ground glass pneumonia l>r     The EKG was ordered, reviewed, and independently interpreted by the ED provider.  Interpretation time: 20:58  Rate: 93 BPM  Rhythm: normal sinus rhythm  Interpretation: Possible inferior infarct. Anterolateral infarct. No STEMI.      The Emergency Provider reviewed the vital signs and test results, which are outlined above.    ED Discussion     12:01 AM   the patient is hypoxemic on 2 L of oxygen with positive COVID infection bilateral pneumonia.  Patient is being admitted to Hospital Medicine.    12:00 AM: Discussed case with MARY JANE Ruiz (Hospital Medicine). Dr. Cadena agrees with current care and management of pt and accepts admission.   Admitting Service:  Hospital Medicine  Admitting Physician: Dr. Cadena  Admit to: Inpatient Tele           ED Medication(s):  Medications   sodium chloride 0.9% bolus 500 mL (has no administration in time range)   dexamethasone injection 6 mg (has no administration in time range)   albuterol-ipratropium 2.5 mg-0.5 mg/3 mL nebulizer solution 3 mL (3 mLs Nebulization Given 9/15/20 2135)             Medical Decision Making    Medical Decision Making:   Clinical Tests:   Lab Tests: Ordered and Reviewed  Radiological Study: Ordered and Reviewed  Medical Tests: Ordered and Reviewed           Scribe Attestation:   Scribe #1: I performed the above scribed service and the documentation accurately describes the services I performed. I attest to the accuracy of the note.    Attending:   Physician Attestation Statement for Scribe #1: I, Abner Vickers Jr., MD, personally performed the services described in this documentation, as scribed by Dianelys Crowe, in my presence, and it is both accurate and complete.          Clinical Impression       ICD-10-CM ICD-9-CM   1. Pneumonia of both lower lobes due to infectious organism  J18.1 483.8   2. Suspected Covid-19 Virus Infection  R68.89    3. Hypoxemia  R09.02 799.02       Disposition:   Disposition: Admitted  Condition: Fair         Abner Vickers Jr., MD  09/16/20 0009

## 2020-09-16 NOTE — SUBJECTIVE & OBJECTIVE
No past medical history on file.    No past surgical history on file.    Review of patient's allergies indicates:  No Known Allergies    No current facility-administered medications on file prior to encounter.      No current outpatient medications on file prior to encounter.     Family History     None        Tobacco Use    Smoking status: Not on file   Substance and Sexual Activity    Alcohol use: Not on file    Drug use: Not on file    Sexual activity: Not on file     Review of Systems   Constitutional: Positive for fatigue and fever.   HENT: Negative.    Eyes: Negative.    Respiratory: Positive for cough, shortness of breath and wheezing.    Gastrointestinal: Negative.    Endocrine: Negative.    Genitourinary: Negative.    Musculoskeletal: Negative.    Allergic/Immunologic: Negative.    Neurological: Negative.    Hematological: Negative.    Psychiatric/Behavioral: Negative.      Objective:     Vital Signs (Most Recent):  Temp: 98.3 °F (36.8 °C) (09/16/20 0000)  Pulse: 104 (09/16/20 0000)  Resp: 18 (09/16/20 0000)  BP: (!) 167/84 (09/16/20 0000)  SpO2: 96 % (09/16/20 0000) Vital Signs (24h Range):  Temp:  [98.3 °F (36.8 °C)-99.3 °F (37.4 °C)] 98.3 °F (36.8 °C)  Pulse:  [] 104  Resp:  [18-24] 18  SpO2:  [87 %-96 %] 96 %  BP: (163-167)/(84-87) 167/84     Weight: 131.8 kg (290 lb 7.3 oz)  Body mass index is 42.89 kg/m².    Physical Exam  Vitals signs and nursing note reviewed.   Constitutional:       General: He is not in acute distress.     Appearance: Normal appearance. He is not ill-appearing.   HENT:      Head: Normocephalic and atraumatic.      Nose: Nose normal. No congestion or rhinorrhea.      Mouth/Throat:      Mouth: Mucous membranes are moist.   Eyes:      Conjunctiva/sclera: Conjunctivae normal.      Pupils: Pupils are equal, round, and reactive to light.   Neck:      Musculoskeletal: Normal range of motion and neck supple. No neck rigidity or muscular tenderness.      Vascular: No carotid  bruit.   Cardiovascular:      Rate and Rhythm: Normal rate and regular rhythm.      Pulses: Normal pulses.      Heart sounds: Normal heart sounds. No murmur. No friction rub.   Pulmonary:      Effort: Pulmonary effort is normal. No respiratory distress.      Breath sounds: Normal breath sounds. No stridor. No wheezing, rhonchi or rales.   Abdominal:      General: Abdomen is flat. Bowel sounds are normal. There is no distension.      Palpations: Abdomen is soft. There is no mass.      Tenderness: There is no abdominal tenderness. There is no guarding or rebound.      Hernia: No hernia is present.   Musculoskeletal: Normal range of motion.         General: No swelling, tenderness, deformity or signs of injury.   Lymphadenopathy:      Cervical: No cervical adenopathy.   Skin:     General: Skin is warm.      Coloration: Skin is not jaundiced or pale.      Findings: No bruising or erythema.   Neurological:      General: No focal deficit present.      Mental Status: He is alert and oriented to person, place, and time. Mental status is at baseline.      Cranial Nerves: No cranial nerve deficit.      Sensory: No sensory deficit.           CRANIAL NERVES     CN III, IV, VI   Pupils are equal, round, and reactive to light.       Significant Labs: All pertinent labs within the past 24 hours have been reviewed.    Significant Imaging: I have reviewed all pertinent imaging results/findings within the past 24 hours.

## 2020-09-16 NOTE — PLAN OF CARE
Patient updated on plan of care. Free of falls or injury and call light within reach. Will continue to monitor  Problem: Adult Inpatient Plan of Care  Goal: Plan of Care Review  Outcome: Ongoing, Progressing  Goal: Patient-Specific Goal (Individualization)  Outcome: Ongoing, Progressing  Goal: Absence of Hospital-Acquired Illness or Injury  Outcome: Ongoing, Progressing  Goal: Optimal Comfort and Wellbeing  Outcome: Ongoing, Progressing  Goal: Readiness for Transition of Care  Outcome: Ongoing, Progressing  Goal: Rounds/Family Conference  Outcome: Ongoing, Progressing     Problem: Bariatric Environmental Safety  Goal: Safety Maintained with Care  Outcome: Ongoing, Progressing

## 2020-09-16 NOTE — ASSESSMENT & PLAN NOTE
Start prednisone 6 mg po qd x 10 days  Start vitamins and minerals   Keep o2 sat > 92 %  F/u daily labs

## 2020-09-16 NOTE — PLAN OF CARE
Spoke to patient by phone due to Covid-19 diagnosis. Patient was alert and oriented and able to make needs known.  Patient is independent with adls and iadls.  Patient lives with spouse who will be his help at home.  Patient denies any post hospital needs or services at this time.  Discussed may need home 02. 's name and number given over the phone. Transitional Care Folder, Discharge Planning Begins on Admission pamphlet, [facility] Bedside Delivery pamphlet, Advance Directive information given to patient along with the contact information given.Instructed patient or family to call with any questions or concerns.     D/c plan: home  D/c transportation:  spouse  Preferred Pharmacy:  [facility].  Bedside Delivery: yes  [facility]: link sent  PCP:  none     09/16/20 3994   Discharge Assessment   Assessment Type Discharge Planning Assessment   Confirmed/corrected address and phone number on facesheet? Yes   Assessment information obtained from? Patient   Communicated expected length of stay with patient/caregiver yes   Prior to hospitilization cognitive status: Alert/Oriented   Prior to hospitalization functional status: Independent   Current cognitive status: Alert/Oriented   Current Functional Status: Independent   Lives With spouse   Able to Return to Prior Arrangements yes   Is patient able to care for self after discharge? Yes   Who are your caregiver(s) and their phone number(s)? April Aicha, spouse [phone]   Readmission Within the Last 30 Days no previous admission in last 30 days   Patient currently being followed by outpatient case management? No   Patient currently receives any other outside agency services? No   Equipment Currently Used at Home none   Do you have any problems affording any of your prescribed medications?   (n/a - on no meds)   Is the patient taking medications as prescribed?   (n/a - on no meds)   Does the patient have transportation home? Yes    Transportation Anticipated family or friend will provide   Does the patient receive services at the [facility]? No   Discharge Plan A Home   DME Needed Upon Discharge  none   Patient/Family in Agreement with Plan yes

## 2020-09-17 ENCOUNTER — NURSE TRIAGE (OUTPATIENT)
Dept: ADMINISTRATIVE | Facility: CLINIC | Age: 60
End: 2020-09-17

## 2020-09-17 LAB
HAV IGM SERPL QL IA: NEGATIVE
HBV CORE IGM SERPL QL IA: NEGATIVE
HBV SURFACE AG SERPL QL IA: NEGATIVE
HCV AB SERPL QL IA: NEGATIVE

## 2020-09-17 NOTE — TELEPHONE ENCOUNTER
2nd attempt to contact pt for enrollment in Covid Surveillance program. No answer x2. Left voicemail but unable to send [employer] message due to pending status. Will attempt outreach again tomorrow.    Reason for Disposition   Message left on unidentified voice mail. Phone number verified.    Protocols used: NO CONTACT OR DUPLICATE CONTACT CALL-A-[place]

## 2020-09-17 NOTE — TELEPHONE ENCOUNTER
1st attempt to contact pt for enrollment in Covid Surveillance program. No answer x2. Left voicemail but unable to send [employer] message due to pending status. Will attempt outreach again at a later time.      Reason for Disposition   Message left on unidentified voice mail. Phone number verified.    Protocols used: NO CONTACT OR DUPLICATE CONTACT CALL-A-[place]

## 2020-09-18 ENCOUNTER — NURSE TRIAGE (OUTPATIENT)
Dept: ADMINISTRATIVE | Facility: CLINIC | Age: 60
End: 2020-09-18

## 2020-09-18 NOTE — TELEPHONE ENCOUNTER
3rd attempt  Attempted to contact patient on behalf of [facility]'s Covid Surveillance Program enrollment and orientation process.  No answer  No contact   Enrolled in [id number]

## 2020-09-21 LAB
BACTERIA BLD CULT: NORMAL
BACTERIA BLD CULT: NORMAL

## 2020-09-21 NOTE — HOSPITAL COURSE
Pt admitted for Hypoxia and Pneumonia in the setting of COVID 19 virus.  Imaging supports scattered pulmonary infiltrates which can be seen with viral pneumonitis/COVID-19. Pt treated with supplemental oxygen, antibiotics, inhaler treatments, steroids, and vitamin supplements.  Elevated LFTs, Ferritin, CPK, LD, ESR, and D-dimer noted.  Pt verbalized symptom improvement on prescribed regime.  Home oxygen evaluation performed and pt does not require oxygen.  Pt verbalized symptom improvement and states he is very eager for discharge.  No signs of acute distress noted.  Pt examined and deemed stable for discharge to home.  Pt instructed to maintain compliance with prescribed medications, dietary restrictions, and follow up appointments with understanding verbalized.  Current medications resumed with Tylenol, Vitamin C, ASA, Decadron, Robitussin, MVI, and Vitamin D prescribed.  Pulse oximeter ordered.  COVID 19 discharge instructions reviewed with patient prior to discharge with understanding verbalized.  Pt instructed to follow up with [facility] upon discharge for further evaluation.

## 2020-09-21 NOTE — PLAN OF CARE
09/21/20 1606   Final Note   Assessment Type Final Discharge Note   Anticipated Discharge Disposition Home   Right Care Referral Info   Post Acute Recommendation No Care